# Patient Record
Sex: MALE | Race: WHITE | NOT HISPANIC OR LATINO | URBAN - METROPOLITAN AREA
[De-identification: names, ages, dates, MRNs, and addresses within clinical notes are randomized per-mention and may not be internally consistent; named-entity substitution may affect disease eponyms.]

---

## 2020-06-26 ENCOUNTER — TELEHEALTH PROVIDED OTHER THAN IN PATIENT'S HOME (OUTPATIENT)
Dept: URBAN - METROPOLITAN AREA TELEHEALTH 3 | Facility: TELEHEALTH | Age: 75
End: 2020-06-26
Payer: MEDICARE

## 2020-06-26 VITALS — HEIGHT: 74 IN | WEIGHT: 275 LBS

## 2020-06-26 DIAGNOSIS — K57.32 DIVERTICULITIS OF LARGE INTESTINE WITHOUT PERFORATION OR ABS: ICD-10-CM

## 2020-06-26 DIAGNOSIS — R10.32 LEFT LOWER QUADRANT PAIN: ICD-10-CM

## 2020-06-26 PROCEDURE — 99204 OFFICE O/P NEW MOD 45 MIN: CPT | Mod: 95 | Performed by: INTERNAL MEDICINE

## 2020-06-26 NOTE — SERVICEHPINOTES
PATIENT VERIFIED BY DATE OF BIRTH AND NAME. Patient has been consented for this telecommunication visit.    75 yo male with prostate CA, had a body scan and told may have diverticulitis.  Seen by PMD several months ago.  Pt states tx for Diverticulitis with Cipro and this helped his sx.  Pt states his sx worse after 3 months.  Pt tx with Mobic by his PMD and this did not really help.  Pt states he tried OTC meds as well.  Pt states he has intermittent abdm pain in the llq.  Pt states tight fitting clothes worsen his sx.  States Beano helps his sx as well.  For the most part has more regular BMs.  Pt denies wgt loss - has actually gained weight with his cancer tx, anti-testosterone drug.  Appetite is good.  Has gained 20 lbs.  Pt states in 2008 with me.  Pt had XRT external beam - 1 1/2 years of anti-testosterone injections.

## 2020-08-22 ENCOUNTER — OFFICE (OUTPATIENT)
Dept: URBAN - METROPOLITAN AREA CLINIC 34 | Facility: CLINIC | Age: 75
End: 2020-08-22

## 2020-08-22 PROCEDURE — 00038: CPT | Performed by: INTERNAL MEDICINE

## 2020-09-10 ENCOUNTER — ON CAMPUS - OUTPATIENT (OUTPATIENT)
Dept: URBAN - METROPOLITAN AREA HOSPITAL 16 | Facility: HOSPITAL | Age: 75
End: 2020-09-10
Payer: MEDICARE

## 2020-09-10 DIAGNOSIS — R10.32 LEFT LOWER QUADRANT PAIN: ICD-10-CM

## 2020-09-10 DIAGNOSIS — D12.5 BENIGN NEOPLASM OF SIGMOID COLON: ICD-10-CM

## 2020-09-10 DIAGNOSIS — K57.32 DIVERTICULITIS OF LARGE INTESTINE WITHOUT PERFORATION OR ABS: ICD-10-CM

## 2020-09-10 PROCEDURE — 45380 COLONOSCOPY AND BIOPSY: CPT | Performed by: INTERNAL MEDICINE
